# Patient Record
Sex: MALE | Race: WHITE | NOT HISPANIC OR LATINO | Employment: OTHER | URBAN - METROPOLITAN AREA
[De-identification: names, ages, dates, MRNs, and addresses within clinical notes are randomized per-mention and may not be internally consistent; named-entity substitution may affect disease eponyms.]

---

## 2019-07-22 ENCOUNTER — HOSPITAL ENCOUNTER (OUTPATIENT)
Dept: RADIATION ONCOLOGY | Facility: HOSPITAL | Age: 64
Setting detail: RADIATION/ONCOLOGY SERIES
Discharge: HOME | End: 2019-07-22

## 2019-07-22 ENCOUNTER — OFFICE VISIT (OUTPATIENT)
Dept: RADIATION ONCOLOGY | Facility: HOSPITAL | Age: 64
End: 2019-07-22

## 2019-07-22 VITALS
DIASTOLIC BLOOD PRESSURE: 81 MMHG | OXYGEN SATURATION: 97 % | WEIGHT: 215.2 LBS | RESPIRATION RATE: 20 BRPM | HEART RATE: 77 BPM | BODY MASS INDEX: 26.9 KG/M2 | TEMPERATURE: 97.6 F | SYSTOLIC BLOOD PRESSURE: 146 MMHG

## 2019-07-22 DIAGNOSIS — C61 PROSTATE CANCER (HCC): Primary | ICD-10-CM

## 2019-07-22 PROCEDURE — G0463 HOSPITAL OUTPT CLINIC VISIT: HCPCS | Performed by: RADIOLOGY

## 2019-07-22 RX ORDER — TAMSULOSIN HYDROCHLORIDE 0.4 MG/1
1 CAPSULE ORAL DAILY
Refills: 0 | COMMUNITY
Start: 2019-04-29

## 2019-07-22 NOTE — PROGRESS NOTES
CONSULTATION NOTE      :                                                          1955  DATE OF CONSULTATION:                       2019   REQUESTING PHYSICIAN:                   Heri Meraz MD  REASON FOR CONSULTATION:           Prostate cancer (CMS/[facility])  Staging form: Prostate, AJCC 8th Edition  - Clinical stage from 2019: Stage I (cT1c, cN0, cM0, PSA: 5.5, Grade Group: 1) - Signed by Navjot Gagnon MD on 2019       BRIEF HISTORY:  The patient is a very pleasant 64 y.o. male  with recent diagnosed prostate cancer.  He presented with an elevated PSA of 5.5 ng/ml on 2019.  He has benign prostatic hypertrophy, on alpha-blocker, but with no recent change in urinary symptoms.  Prostate volume 59.1 cc with no suspicious abnormality on HERB or ultrasound.  Biopsy 2019 revealed prostatic adenocarcinoma, [place] score 3+3 = 6 involving 1 out of 6 cores in the left lobe, occupying 5% of submitted tissue submitted.  Right lobe was benign.  He is on blood thinner for chronic DVT/PE.  Anticoagulation was held for the biopsy.  He did experience prolonged hematuria however voiding has returned to baseline now.  He is interested in definitive treatment.  He would like to avoid surgery.    No Known Allergies    Social History     Socioeconomic History   • Marital status:      Spouse name: Not on file   • Number of children: Not on file   • Years of education: Not on file   • Highest education level: Not on file   Tobacco Use   • Smoking status: Never Smoker   • Smokeless tobacco: Never Used   Substance and Sexual Activity   • Alcohol use: Yes     Alcohol/week: 6.0 oz     Types: 10 Cans of beer per week     Comment: 6-8 drinks per week   • Drug use: No   • Sexual activity: Yes     Partners: Female       Past Medical History:   Diagnosis Date   • Arthritis    • DVT (deep venous thrombosis) (CMS/HCC)    • Mitral valve prolapse    • Prostate cancer (CMS/[facility])    • Pulmonary emboli  (CMS/[facility]) 05/2019       family history includes Breast cancer in his mother; Diabetes in his father; Heart disease in his father.     Past Surgical History:   Procedure Laterality Date   • COLONOSCOPY  2014   • PROSTATE BIOPSY  2019        Review of Systems   Constitutional: Negative.    HENT:  Negative.    Eyes: Negative.    Respiratory: Positive for shortness of breath (recent pulmonary embolism).    Cardiovascular: Positive for leg swelling (right ankle).   Gastrointestinal: Negative.    Endocrine: Negative.    Genitourinary: Negative.     Musculoskeletal: Positive for back pain.   Skin: Negative.    Neurological: Negative.    Hematological: Negative.    Psychiatric/Behavioral: Negative.          IPSS Questionnaire (AUA-7):  Over the past month…    1)  Incomplete Emptying  How often have you had a sensation of not emptying your bladder?  1 - Less than 1 time in 5   2)  Frequency  How often have you had to urinate less than every two hours? 1 - Less than 1 time in 5   3)  Intermittency  How often have you found you stopped and started again several times when you urinated?  0 - Not at all   4) Urgency  How often have you found it difficult to postpone urination?  1 - Less than 1 time in 5   5) Weak Stream  How often have you had a weak urinary stream?  0 - Not at all   6) Straining  How often have you had to push or strain to begin urination?  0 - Not at all   7) Nocturia  How many times did you typically get up at night to urinate?  1 - 1 time   Total Score:  4       Quality of life due to urinary symptoms:  If you were to spend the rest of your life with your urinary condition the way it is now, how would you feel about that? 2-Mostly Satisfied   Urine Leakage (Incontinence) 0-No Leakage     Sexual Health Inventory  Current Status    1)  How do you rate your confidence that you could achieve and keep an erection? 5-Very High   2) When you had erections with sexual stimulation, how often were your erections hard  enough for penetration (entering your partner)? 5-Almost always or always   3)  During sexual intercourse, how often were you able to maintain your erection after you had penetrated (entered) into your partner? 5-Almost always or always   4) During sexual intercourse, how difficult was it to maintain your erection to completion of intercourse? 5-Not difficult   5) When you attempted sexual intercourse, how often was it satisfactory to you? 5-Almost always or always   Total Score: 25       Bowel Health Inventory  Current Status: 0-No problems, no rectal bleeding, no discharge, less then 5 bowel movements a day             Objective   VITAL SIGNS:   Vitals:    07/22/19 1009   BP: 146/81   Pulse: 77   Resp: 20   Temp: 97.6 °F (36.4 °C)   TempSrc: Temporal   SpO2: 97%   Weight: 97.6 kg (215 lb 3.2 oz)   PainSc: 0-No pain        Karnofsky score: 90        Physical Exam   Constitutional: He is oriented to person, place, and time. He appears well-developed and well-nourished.   HENT:   Head: Normocephalic and atraumatic.   Neck: Normal range of motion. Neck supple.   Cardiovascular: Normal rate, regular rhythm and normal heart sounds.   No murmur heard.  Pulmonary/Chest: Effort normal and breath sounds normal. He has no wheezes. He has no rales.   Abdominal: Soft. Bowel sounds are normal. He exhibits no distension. There is no hepatosplenomegaly. There is no tenderness.   Genitourinary: Rectal exam shows no external hemorrhoid, no internal hemorrhoid, no fissure, no mass, no tenderness and anal tone normal. Prostate is enlarged ( Symmetrically enlarged, approximately 60 cc, smooth surface, no nodules or induration.  Seminal vesicles are nonpalpable.). Prostate is not tender.   Musculoskeletal: Normal range of motion. He exhibits no edema or tenderness.   Lymphadenopathy:     He has no cervical adenopathy.     He has no axillary adenopathy.        Right: No supraclavicular adenopathy present.        Left: No supraclavicular  adenopathy present.   Neurological: He is alert and oriented to person, place, and time. He has normal strength. No sensory deficit.   Skin: Skin is warm and dry.   Psychiatric: He has a normal mood and affect. His behavior is normal. Judgment and thought content normal.   Nursing note and vitals reviewed.           The following portions of the patient's history were reviewed and updated as appropriate: allergies, current medications, past family history, past medical history, past social history, past surgical history and problem list.    Assessment      Prostate cancer, [place] score 3+3 = 6, clinical stage I (T1c, N0, M0), PSA 5.5 ng/ml.  He has very low risk and prognosis is excellent.  He prefers definitive treatment overactive surveillance and is healthy enough to undergo treatment.  He is interested in non-surgical approach.  We reviewed the use of IMRT, SBRT and brachytherapy.  He would like to proceed with stereotactic body radiotherapy.  The CyberKnife treatment procedure has been reviewed in detail today and informed consent was obtained today.    RECOMMENDATIONS: He will return to Dr. Meraz for placement of gold seed fiducials.  He will need to be off anticoagulation briefly.  He will return at least 1 week later, after hematuria clears and urinary voiding returned to baseline post procedure, for treatment planning CT and MRI pelvis.  He is also due for repeat colonoscopy, which should be performed prior to prostate cancer treatment.  The prostate gland will receive 5 fractions of 7 Gray each on the CyberKnife.    Return in about 4 weeks (around 8/19/2019) for Office Visit, Simulation, CyberKnife treatment.  Chandrakant was seen today for prostate cancer.    Diagnoses and all orders for this visit:    Prostate cancer (CMS/[facility])         Navjot Gagnon MD

## 2019-07-24 DIAGNOSIS — Z12.11 SCREENING FOR COLON CANCER: Primary | ICD-10-CM

## 2019-07-29 DIAGNOSIS — C61 PROSTATE CANCER (HCC): Primary | ICD-10-CM

## 2019-07-30 ENCOUNTER — OUTSIDE FACILITY SERVICE (OUTPATIENT)
Dept: GASTROENTEROLOGY | Facility: CLINIC | Age: 64
End: 2019-07-30

## 2019-07-30 PROCEDURE — 45385 COLONOSCOPY W/LESION REMOVAL: CPT | Performed by: INTERNAL MEDICINE

## 2019-07-30 PROCEDURE — G0500 MOD SEDAT ENDO SERVICE >5YRS: HCPCS | Performed by: INTERNAL MEDICINE

## 2019-07-31 ENCOUNTER — TELEPHONE (OUTPATIENT)
Dept: GASTROENTEROLOGY | Facility: CLINIC | Age: 64
End: 2019-07-31

## 2019-07-31 NOTE — TELEPHONE ENCOUNTER
Jacklyn,   Patient called and left message on MA line. He has a question about his Colonoscopy recall. Why 3 years instead of 5 years? Please call patient back. Thanks

## 2019-08-22 ENCOUNTER — TELEPHONE (OUTPATIENT)
Dept: NUTRITION | Facility: HOSPITAL | Age: 64
End: 2019-08-22

## 2019-08-28 ENCOUNTER — OFFICE VISIT (OUTPATIENT)
Dept: RADIATION ONCOLOGY | Facility: HOSPITAL | Age: 64
End: 2019-08-28

## 2019-08-28 ENCOUNTER — HOSPITAL ENCOUNTER (OUTPATIENT)
Dept: RADIATION ONCOLOGY | Facility: HOSPITAL | Age: 64
Setting detail: RADIATION/ONCOLOGY SERIES
Discharge: HOME | End: 2019-08-28

## 2019-08-28 ENCOUNTER — HOSPITAL ENCOUNTER (OUTPATIENT)
Dept: RADIATION ONCOLOGY | Facility: HOSPITAL | Age: 64
Discharge: HOME | End: 2019-08-28

## 2019-08-28 ENCOUNTER — HOSPITAL ENCOUNTER (OUTPATIENT)
Dept: MRI IMAGING | Facility: HOSPITAL | Age: 64
Discharge: HOME | End: 2019-08-28
Admitting: RADIOLOGY

## 2019-08-28 VITALS
TEMPERATURE: 98.1 F | OXYGEN SATURATION: 98 % | HEART RATE: 80 BPM | DIASTOLIC BLOOD PRESSURE: 80 MMHG | SYSTOLIC BLOOD PRESSURE: 136 MMHG | BODY MASS INDEX: 26.42 KG/M2 | WEIGHT: 211.4 LBS | RESPIRATION RATE: 20 BRPM

## 2019-08-28 DIAGNOSIS — C61 PROSTATE CANCER (HCC): ICD-10-CM

## 2019-08-28 DIAGNOSIS — C61 PROSTATE CANCER (HCC): Primary | ICD-10-CM

## 2019-08-28 PROCEDURE — 72195 MRI PELVIS W/O DYE: CPT

## 2019-08-28 PROCEDURE — 77290 THER RAD SIMULAJ FIELD CPLX: CPT | Performed by: RADIOLOGY

## 2019-08-28 PROCEDURE — G0463 HOSPITAL OUTPT CLINIC VISIT: HCPCS

## 2019-09-03 ENCOUNTER — HOSPITAL ENCOUNTER (OUTPATIENT)
Dept: RADIATION ONCOLOGY | Facility: HOSPITAL | Age: 64
Setting detail: RADIATION/ONCOLOGY SERIES
Discharge: HOME | End: 2019-09-03

## 2019-09-03 PROCEDURE — 77295 3-D RADIOTHERAPY PLAN: CPT | Performed by: RADIOLOGY

## 2019-09-03 PROCEDURE — 77334 RADIATION TREATMENT AID(S): CPT | Performed by: RADIOLOGY

## 2019-09-03 PROCEDURE — 77300 RADIATION THERAPY DOSE PLAN: CPT | Performed by: RADIOLOGY

## 2019-09-06 ENCOUNTER — HOSPITAL ENCOUNTER (OUTPATIENT)
Dept: RADIATION ONCOLOGY | Facility: HOSPITAL | Age: 64
Discharge: HOME | End: 2019-09-06

## 2019-09-06 PROCEDURE — 77373 STRTCTC BDY RAD THER TX DLVR: CPT | Performed by: RADIOLOGY

## 2019-09-06 PROCEDURE — 77280 THER RAD SIMULAJ FIELD SMPL: CPT | Performed by: RADIOLOGY

## 2019-09-09 ENCOUNTER — HOSPITAL ENCOUNTER (OUTPATIENT)
Dept: RADIATION ONCOLOGY | Facility: HOSPITAL | Age: 64
Discharge: HOME | End: 2019-09-09

## 2019-09-09 PROCEDURE — 77373 STRTCTC BDY RAD THER TX DLVR: CPT | Performed by: RADIOLOGY

## 2019-09-10 ENCOUNTER — HOSPITAL ENCOUNTER (OUTPATIENT)
Dept: RADIATION ONCOLOGY | Facility: HOSPITAL | Age: 64
Discharge: HOME | End: 2019-09-10

## 2019-09-10 PROCEDURE — 77373 STRTCTC BDY RAD THER TX DLVR: CPT | Performed by: RADIOLOGY

## 2019-09-11 ENCOUNTER — HOSPITAL ENCOUNTER (OUTPATIENT)
Dept: RADIATION ONCOLOGY | Facility: HOSPITAL | Age: 64
Discharge: HOME | End: 2019-09-11

## 2019-09-11 PROCEDURE — 77373 STRTCTC BDY RAD THER TX DLVR: CPT | Performed by: RADIOLOGY

## 2019-09-12 ENCOUNTER — HOSPITAL ENCOUNTER (OUTPATIENT)
Dept: RADIATION ONCOLOGY | Facility: HOSPITAL | Age: 64
Discharge: HOME | End: 2019-09-12

## 2019-09-12 PROCEDURE — 77373 STRTCTC BDY RAD THER TX DLVR: CPT | Performed by: RADIOLOGY

## 2019-09-12 PROCEDURE — 77336 RADIATION PHYSICS CONSULT: CPT | Performed by: RADIOLOGY

## 2019-10-15 ENCOUNTER — CLINICAL SUPPORT (OUTPATIENT)
Dept: ONCOLOGY | Facility: CLINIC | Age: 64
End: 2019-10-15

## 2019-10-15 VITALS
WEIGHT: 221 LBS | HEIGHT: 75 IN | HEART RATE: 58 BPM | BODY MASS INDEX: 27.48 KG/M2 | RESPIRATION RATE: 18 BRPM | TEMPERATURE: 98.5 F | DIASTOLIC BLOOD PRESSURE: 86 MMHG | SYSTOLIC BLOOD PRESSURE: 138 MMHG | OXYGEN SATURATION: 100 %

## 2019-10-15 DIAGNOSIS — C61 PROSTATE CANCER (HCC): Primary | ICD-10-CM

## 2019-10-15 PROCEDURE — 99214 OFFICE O/P EST MOD 30 MIN: CPT | Performed by: NURSE PRACTITIONER

## 2019-10-15 NOTE — PROGRESS NOTES
FOLLOW UP NOTE    PATIENT:                                                      Chandrakant Colvin  MEDICAL RECORD #:                        [id number]  :                                                          1955  COMPLETION DATE:               2019  DIAGNOSIS:     Cancer Staging  Prostate cancer (CMS/[facility])  Staging form: Prostate, AJCC 8th Edition  - Clinical stage from 2019: Stage I (cT1c, cN0, cM0, PSA: 5.5, Grade Group: 1) - Signed by Navjot Gagnon MD on 2019        BRIEF HISTORY:    Initial follow-up visit after CyberKnife SBRT for prostate cancer.  He currently has no complaints related to bladder function or bowel function.  He continues to use alpha-blocker.  He did experience approximately 2-week duration of urinary frequency and urgency with dysuria.  He also experienced approximately 2-week duration of loose bowels.  He denies any fatigue.  He has not yet had urologic follow-up or PSA testing.    MEDICATIONS: Medication reconciliation for the patient was reviewed and confirmed in the electronic medical record.    Review of Systems   Constitutional: Negative.    HENT:  Negative.    Eyes: Negative.    Respiratory: Negative.    Cardiovascular: Negative.    Gastrointestinal: Negative.    Endocrine: Negative.    Genitourinary: Negative.     Musculoskeletal: Positive for arthralgias.   Skin: Negative.    Neurological: Negative.    Hematological: Negative.    Psychiatric/Behavioral: Negative.        IPSS Questionnaire (AUA-7):  Over the past month…     1)  Incomplete Emptying  How often have you had a sensation of not emptying your bladder?  1 - Less than 1 time in 5   2)  Frequency  How often have you had to urinate less than every two hours? 1 - Less than 1 time in 5   3)  Intermittency  How often have you found you stopped and started again several times when you urinated?  0 - Not at all   4) Urgency  How often have you found it difficult to postpone urination?  1 - Less than 1  time in 5   5) Weak Stream  How often have you had a weak urinary stream?  0 - Not at all   6) Straining  How often have you had to push or strain to begin urination?  0 - Not at all   7) Nocturia  How many times did you typically get up at night to urinate?  1 - 1 time   Total Score:  4         Quality of life due to urinary symptoms:  If you were to spend the rest of your life with your urinary condition the way it is now, how would you feel about that? 1-Pleased   Urine Leakage (Incontinence) 0-No Leakage      Sexual Health Inventory  Current Status     1)  How do you rate your confidence that you could achieve and keep an erection? 5-Very High   2) When you had erections with sexual stimulation, how often were your erections hard enough for penetration (entering your partner)? 5-Almost always or always   3)  During sexual intercourse, how often were you able to maintain your erection after you had penetrated (entered) into your partner? 5-Almost always or always   4) During sexual intercourse, how difficult was it to maintain your erection to completion of intercourse? 5-Not difficult   5) When you attempted sexual intercourse, how often was it satisfactory to you? 5-Almost always or always   Total Score: 25         Bowel Health Inventory  Current Status: 0-No problems, no rectal bleeding, no discharge, less then 5 bowel movements a day           KPS 90% .    Physical Exam   Constitutional: He is oriented to person, place, and time. He appears well-developed and well-nourished.   HENT:   Head: Normocephalic and atraumatic.   Neck: Normal range of motion. Neck supple.   Cardiovascular: Normal rate, regular rhythm and normal heart sounds.   No murmur heard.  Pulmonary/Chest: Effort normal and breath sounds normal. He has no wheezes. He has no rales.   Abdominal: Soft. Bowel sounds are normal. He exhibits no mass. There is no tenderness.   Musculoskeletal: Normal range of motion. He exhibits no edema.  "  Lymphadenopathy:     He has no cervical adenopathy.     He has no axillary adenopathy.        Right: No supraclavicular adenopathy present.        Left: No supraclavicular adenopathy present.   Neurological: He is alert and oriented to person, place, and time.   Skin: Skin is warm and dry.   Psychiatric: He has a normal mood and affect. His behavior is normal. Thought content normal.       VITAL SIGNS:   Vitals:    10/15/19 1455   BP: 138/86   Pulse: 58   Resp: 18   Temp: 98.5 °F (36.9 °C)   SpO2: 100%   Weight: 100 kg (221 lb)   Height: 190.5 cm (75\")   PainSc: 0-No pain       The following portions of the patient's history were reviewed and updated as appropriate: allergies, current medications, past family history, past medical history, past social history, past surgical history and problem list.         Diagnoses and all orders for this visit:    Prostate cancer (CMS/[facility])         IMPRESSION:  Prostate cancer, [place] score 3+3 = 6, clinical stage I (T1c, N0, M0), PSA 5.5 ng/ml.  He tolerated CyberKnife SBRT well.    RECOMMENDATIONS: He plans to continue urologic surveillance under the care of Dr. Meraz with next follow-up and PSA testing in November 2019.    Return in about 6 months (around December 27, 2019) for office visit with Dr. Gagnon.    The patient and I have reviewed [facility] personal Survivorship Care Plan in detail. We discussed diagnosis, all treatments, and ongoing surveillance recommendations. All questions were answered to his satisfaction. The patient is in agreement with our plan for ongoing surveillance as outlined in the plan. A copy of this document was provided at the completion of our visit.  A copy has also been sent to the patient's primary care provider.    This was a 25 minute visit with 20 minutes spent in direct face to face review of the Survivorship Care Plan.        Jayda Castro, APRN   10/15/2019      Errors in dictation may reflect use of voice recognition software and not " all errors in transcription may have been detected prior to signing.

## 2019-12-27 ENCOUNTER — HOSPITAL ENCOUNTER (OUTPATIENT)
Dept: RADIATION ONCOLOGY | Facility: HOSPITAL | Age: 64
Setting detail: RADIATION/ONCOLOGY SERIES
Discharge: HOME | End: 2019-12-27

## 2019-12-27 ENCOUNTER — OFFICE VISIT (OUTPATIENT)
Dept: RADIATION ONCOLOGY | Facility: HOSPITAL | Age: 64
End: 2019-12-27

## 2019-12-27 VITALS
BODY MASS INDEX: 27.8 KG/M2 | HEART RATE: 68 BPM | RESPIRATION RATE: 18 BRPM | OXYGEN SATURATION: 98 % | WEIGHT: 223.6 LBS | SYSTOLIC BLOOD PRESSURE: 147 MMHG | HEIGHT: 75 IN | TEMPERATURE: 98.6 F | DIASTOLIC BLOOD PRESSURE: 75 MMHG

## 2019-12-27 DIAGNOSIS — C61 PROSTATE CANCER (HCC): Primary | ICD-10-CM

## 2019-12-27 NOTE — PROGRESS NOTES
I have reviewed the notes, assessments, and/or procedures performed by TRENT Roach, I concur with her/his documentation of Chandrakant Colvin.  I personally performed HERB and reviewed with patient.

## 2019-12-27 NOTE — PROGRESS NOTES
FOLLOW UP NOTE    PATIENT:                                                      Chandrakant Colvin  MEDICAL RECORD #:                        [id number]  :                                                          1955  COMPLETION DATE:    2019  DIAGNOSIS:     Prostate cancer (CMS/[facility])  - Stage I (cT1c, cN0, cM0, PSA: 5.5, Grade Group: 1)      BRIEF HISTORY:    Routine follow-up visit.   He has a history of very low risk prostate cancer treated with CyberKnife SBRT, completed 19.  He is voiding well with no significant urinary complaints, specifically denies urinary irritative or obstructive outflow symptoms.     He continues to take tamsulosin nightly.   Bowels are normal.  No fatigue or new aches or pains.  Energy is good.  Most recent PSA from 19 was 1.6 ng/mL.    MEDICATIONS: Medication reconciliation for the patient was reviewed and confirmed in the electronic medical record.    Review of Systems   Constitutional: Negative.    Respiratory: Negative.    Cardiovascular: Negative.    Gastrointestinal: Negative.    Endocrine: Negative.    Genitourinary: Negative.     Musculoskeletal: Positive for back pain.   Neurological: Negative.    Psychiatric/Behavioral: Negative.        IPSS Questionnaire (AUA-7):  Over the past month…    1)  Incomplete Emptying  How often have you had a sensation of not emptying your bladder?  0   2)  Frequency  How often have you had to urinate less than every two hours? 0   3)  Intermittency  How often have you found you stopped and started again several times when you urinated?  0   4) Urgency  How often have you found it difficult to postpone urination?  1   5) Weak Stream  How often have you had a weak urinary stream?  0   6) Straining  How often have you had to push or strain to begin urination?  0   7) Nocturia  How many times did you typically get up at night to urinate?  1   Total Score:  2       Quality of life due to urinary symptoms:  If you were to spend the  rest of your life with your urinary condition the way it is now, how would you feel about that? 0   Urine Leakage (Incontinence) 0     Sexual Health Inventory  Current Status    1)  How do you rate your confidence that you could achieve and keep an erection? 5   2) When you had erections with sexual stimulation, how often were your erections hard enough for penetration (entering your partner)? 5   3)  During sexual intercourse, how often were you able to maintain your erection after you had penetrated (entered) into your partner? 5   4) During sexual intercourse, how difficult was it to maintain your erection to completion of intercourse? 5   5) When you attempted sexual intercourse, how often was it satisfactory to you? 5   Total Score: 25       Bowel Health Inventory  Current Status: 0       KPS 90%    Physical Exam   Constitutional: He is oriented to person, place, and time. He appears well-developed and well-nourished. No distress.   HENT:   Head: Normocephalic and atraumatic.   Eyes: Pupils are equal, round, and reactive to light. Conjunctivae are normal.   Neck: Normal range of motion. Neck supple.   Cardiovascular: Normal rate and regular rhythm. Exam reveals no friction rub.   No murmur heard.  Pulmonary/Chest: Effort normal and breath sounds normal. He has no wheezes.   Abdominal: Soft. Bowel sounds are normal. He exhibits no distension and no mass. There is no tenderness.   Genitourinary: Rectal exam shows no external hemorrhoid, no internal hemorrhoid and no mass. Prostate is not tender.   Genitourinary Comments: HERB: Prostate 20-30 cc, soft, smooth, symmetrical, flat. No induration or nodularities. Seminal vesicles non-palpable.    Musculoskeletal: Normal range of motion. He exhibits no edema.   Lymphadenopathy:     He has no cervical adenopathy.        Right: No supraclavicular adenopathy present.        Left: No supraclavicular adenopathy present.   Neurological: He is alert and oriented to person,  "place, and time.   Skin: Skin is warm and dry.   Psychiatric: He has a normal mood and affect. His behavior is normal. Judgment and thought content normal.   Nursing note and vitals reviewed.      VITAL SIGNS:   Vitals:    12/27/19 0849   BP: 147/75   Pulse: 68   Resp: 18   Temp: 98.6 °F (37 °C)   TempSrc: Temporal   SpO2: 98%   Weight: 101 kg (223 lb 9.6 oz)   Height: 190.5 cm (75\")   PainSc: 0-No pain   The following portions of the patient's history were reviewed and updated as appropriate: allergies, current medications, past family history, past medical history, past social history, past surgical history and problem list.       Chandrakant was seen today for prostate cancer.    Diagnoses and all orders for this visit:    Prostate cancer (CMS/[facility])      IMPRESSION:  Prostate cancer, [place] score 3+3 = 6, clinical stage I (T1c, N0, M0), pre-treatment PSA 5.5 ng/ml.  He completed CyberKnife SBRT to the prostate approximately 3 months ago.   He tolerated treatment well.  He has had a very good early biochemical response with PSA reduction to 1.6 ng/mL and clinical response with prostate size reduction from 60 cc pre-treatment to 20-30 cc today.  We discussed taper/discontinuation of Flomax as he has achieved resolution of radiation-related urinary symptoms.   I reviewed the survivorship plan with Mr. Colvin today, including the timeframe for follow-up, PSA monitoring, and expectations for response to treatment.    RECOMMENDATIONS:    Taper/discontinue Flomax.  He plans to continue urologic surveillance under the care of Dr. Meraz with a follow-up scheduled for May 2020.    We will see him back in our clinic in approximately 1 year.    Return in about 1 year (around 12/27/2020) for Office Visit.    TRENT Blake, Navjot Gagnon MD, personally performed the services described in this documentation as scribed by the above named individual in my presence, and it is both accurate and complete.  12/27/2019  " 9:21 AM

## 2020-12-28 ENCOUNTER — HOSPITAL ENCOUNTER (OUTPATIENT)
Dept: RADIATION ONCOLOGY | Facility: HOSPITAL | Age: 65
Setting detail: RADIATION/ONCOLOGY SERIES
Discharge: HOME | End: 2020-12-28

## 2020-12-28 ENCOUNTER — OFFICE VISIT (OUTPATIENT)
Dept: RADIATION ONCOLOGY | Facility: HOSPITAL | Age: 65
End: 2020-12-28

## 2020-12-28 VITALS
WEIGHT: 223.4 LBS | TEMPERATURE: 97.8 F | HEIGHT: 75 IN | BODY MASS INDEX: 27.78 KG/M2 | SYSTOLIC BLOOD PRESSURE: 155 MMHG | OXYGEN SATURATION: 97 % | HEART RATE: 78 BPM | RESPIRATION RATE: 16 BRPM | DIASTOLIC BLOOD PRESSURE: 91 MMHG

## 2020-12-28 DIAGNOSIS — C61 PROSTATE CANCER (HCC): Primary | ICD-10-CM

## 2020-12-28 PROCEDURE — G0463 HOSPITAL OUTPT CLINIC VISIT: HCPCS

## 2020-12-28 NOTE — PROGRESS NOTES
FOLLOW UP NOTE    PATIENT:                                                      Chandrakant Colvin  MEDICAL RECORD #:                        [id number]  :                                                          1955  COMPLETION DATE:   2019  DIAGNOSIS:     Prostate cancer (CMS/[facility])  - Stage I (cT1c, cN0, cM0, PSA: 5.5, Grade Group: 1)      BRIEF HISTORY:    Routine follow-up for low risk prostate cancer treated with CyberKnife SBRT.  He is voiding well and has no urinary complaints.  He continues use of Flomax every other day.  He has regular bowel movements.  He denies GI concerns.  He reports excellent erectile function.  Energy level is good.  He remains fairly active.    No new aches or pains.  PSA has continued to decline to a most recent value of 1.5 ng/ml on 2020.      IPSS Questionnaire (AUA-7):  Over the past month…    1)  Incomplete Emptying  How often have you had a sensation of not emptying your bladder?  0 - Not at all   2)  Frequency  How often have you had to urinate less than every two hours? 1 - Less than 1 time in 5   3)  Intermittency  How often have you found you stopped and started again several times when you urinated?  0 - Not at all   4) Urgency  How often have you found it difficult to postpone urination?  1 - Less than 1 time in 5   5) Weak Stream  How often have you had a weak urinary stream?  0 - Not at all   6) Straining  How often have you had to push or strain to begin urination?  0 - Not at all   7) Nocturia  How many times did you typically get up at night to urinate?  1 - 1 time   Total Score:  3       Quality of life due to urinary symptoms:  If you were to spend the rest of your life with your urinary condition the way it is now, how would you feel about that? 0-Delighted   Urine Leakage (Incontinence) 0-No Leakage     Sexual Health Inventory  Current Status    1)  How do you rate your confidence that you could achieve and keep an erection? 5-Very High   2) When you  had erections with sexual stimulation, how often were your erections hard enough for penetration (entering your partner)? 5-Almost always or always   3)  During sexual intercourse, how often were you able to maintain your erection after you had penetrated (entered) into your partner? 5-Almost always or always   4) During sexual intercourse, how difficult was it to maintain your erection to completion of intercourse? 5-Not difficult   5) When you attempted sexual intercourse, how often was it satisfactory to you? 5-Almost always or always   Total Score: 25       Bowel Health Inventory  Current Status: 0-No problems, no rectal bleeding, no discharge, less then 5 bowel movements a day         PSA has continued to decline with most recent value of 1.5 ng/ml on 7/2/2019.      MEDICATIONS: Medication reconciliation for the patient was reviewed and confirmed in the electronic medical record.    Review of Systems   Musculoskeletal: Positive for arthralgias (hips, chronic).   All other systems reviewed and are negative.      KPS 90%    Physical Exam  Vitals signs and nursing note reviewed.   Constitutional:       General: He is not in acute distress.     Appearance: Normal appearance. He is well-developed.   HENT:      Head: Normocephalic and atraumatic.   Eyes:      Conjunctiva/sclera: Conjunctivae normal.      Pupils: Pupils are equal, round, and reactive to light.   Neck:      Musculoskeletal: Normal range of motion and neck supple.   Cardiovascular:      Rate and Rhythm: Normal rate and regular rhythm.      Heart sounds: No murmur. No friction rub.   Pulmonary:      Effort: Pulmonary effort is normal.      Breath sounds: Normal breath sounds. No wheezing.   Abdominal:      General: Bowel sounds are normal. There is no distension.      Palpations: Abdomen is soft. There is no mass.      Tenderness: There is no abdominal tenderness.   Genitourinary:     Prostate: Not enlarged (small, smooth, flat, symmetrical.  No  "nodularity or induration.   Seminal vesicles not palpable.), not tender and no nodules present.      Rectum: No mass, tenderness, external hemorrhoid or internal hemorrhoid.   Musculoskeletal: Normal range of motion.   Lymphadenopathy:      Cervical: No cervical adenopathy.   Skin:     General: Skin is warm and dry.   Neurological:      Mental Status: He is alert and oriented to person, place, and time.   Psychiatric:         Behavior: Behavior normal.         Thought Content: Thought content normal.         Judgment: Judgment normal.         VITAL SIGNS:   Vitals:    12/28/20 0828   BP: 155/91   Pulse: 78   Resp: 16   Temp: 97.8 °F (36.6 °C)   TempSrc: Temporal   SpO2: 97%   Weight: 101 kg (223 lb 6.4 oz)   Height: 190.5 cm (75\")   PainSc: 0-No pain       The following portions of the patient's history were reviewed and updated as appropriate: allergies, current medications, past family history, past medical history, past social history, past surgical history and problem list.         Diagnoses and all orders for this visit:    1. Prostate cancer (CMS/[facility]) (Primary)         IMPRESSION:  Prostate cancer, Bay score 3+3 = 6, clinical stage I (T1c, N0, M0), pre-treatment PSA 5.5 ng/ml.  He is 15 months status post CyberKnife SBRT.  He has an excellent clinical response and appropriate biochemical response with most recent PSA of 1.5 ng/ml in July.  He likely has not yet reached gita value and PSA is expected to continue to decline.  Prognosis should remain excellent.   He will attempt to further taper/discontinue alpha blocker.  We discussed follow-up intervals, PSA monitoring, and continued expectations for response to treatment.      RECOMMENDATIONS:  Mr. Colvin continues urologic surveillance under the care of Dr. Meraz, with follow-up and repeat PSA scheduled 1/6/2021.  I will schedule him to return to our clinic for 1 additional follow-up in a year, at which time PSA should likely reach gita.  He may " choose to cancel follow-up appointment should his PSA reach 0.5 ng/ml or less in the interim.    Return in about 1 year (around 12/28/2021) for Office Visit.    Melissa Evans, APRN

## 2022-06-06 ENCOUNTER — TELEPHONE (OUTPATIENT)
Dept: GASTROENTEROLOGY | Facility: CLINIC | Age: 67
End: 2022-06-06

## 2022-06-06 NOTE — TELEPHONE ENCOUNTER
Patient is scheduled for a colonoscopy with Dr Ferguson on 8/9, but is on the wait list to be seen sooner.  He's on Eliquis prescribed by Dr. Wood.

## 2022-07-28 DIAGNOSIS — Z12.11 SCREEN FOR COLON CANCER: Primary | ICD-10-CM

## 2022-08-09 ENCOUNTER — OUTSIDE FACILITY SERVICE (OUTPATIENT)
Dept: GASTROENTEROLOGY | Facility: CLINIC | Age: 67
End: 2022-08-09

## 2022-08-09 PROCEDURE — 45388 COLONOSCOPY W/ABLATION: CPT | Performed by: INTERNAL MEDICINE

## 2022-08-09 PROCEDURE — G0500 MOD SEDAT ENDO SERVICE >5YRS: HCPCS | Performed by: INTERNAL MEDICINE
